# Patient Record
Sex: FEMALE | Race: WHITE | Employment: OTHER | URBAN - METROPOLITAN AREA
[De-identification: names, ages, dates, MRNs, and addresses within clinical notes are randomized per-mention and may not be internally consistent; named-entity substitution may affect disease eponyms.]

---

## 2018-12-29 ENCOUNTER — HOSPITAL ENCOUNTER (EMERGENCY)
Facility: CLINIC | Age: 58
Discharge: HOME | End: 2018-12-30
Attending: EMERGENCY MEDICINE | Admitting: EMERGENCY MEDICINE
Payer: COMMERCIAL

## 2018-12-29 ENCOUNTER — APPOINTMENT (OUTPATIENT)
Dept: GENERAL RADIOLOGY | Facility: CLINIC | Age: 58
End: 2018-12-29
Attending: EMERGENCY MEDICINE
Payer: COMMERCIAL

## 2018-12-29 DIAGNOSIS — S12.601S CLOSED NONDISPLACED FRACTURE OF SEVENTH CERVICAL VERTEBRA, UNSPECIFIED FRACTURE MORPHOLOGY, SEQUELA: ICD-10-CM

## 2018-12-29 DIAGNOSIS — S20.212A CHEST WALL CONTUSION, LEFT, INITIAL ENCOUNTER: ICD-10-CM

## 2018-12-29 PROCEDURE — 99283 EMERGENCY DEPT VISIT LOW MDM: CPT

## 2018-12-29 PROCEDURE — 25000132 ZZH RX MED GY IP 250 OP 250 PS 637: Performed by: EMERGENCY MEDICINE

## 2018-12-29 PROCEDURE — 71101 X-RAY EXAM UNILAT RIBS/CHEST: CPT | Mod: LT

## 2018-12-29 RX ORDER — LISINOPRIL 20 MG/1
40 TABLET ORAL DAILY
COMMUNITY

## 2018-12-29 RX ORDER — OXYCODONE HYDROCHLORIDE 5 MG/1
5 TABLET ORAL ONCE
Status: COMPLETED | OUTPATIENT
Start: 2018-12-29 | End: 2018-12-29

## 2018-12-29 RX ADMIN — OXYCODONE HYDROCHLORIDE 5 MG: 5 TABLET ORAL at 23:50

## 2018-12-29 ASSESSMENT — MIFFLIN-ST. JEOR: SCORE: 1267.15

## 2018-12-29 ASSESSMENT — ENCOUNTER SYMPTOMS: NECK PAIN: 1

## 2018-12-29 NOTE — ED AVS SNAPSHOT
Emergency Department  [address] [place] [id number]  Phone:  [phone]  Fax:  [phone]                                    Billie Rolon   MRN: [id number]    Department:   Emergency Department   Date of Visit:  12/29/2018           After Visit Summary Signature Page    I have received my discharge instructions, and my questions have been answered. I have discussed any challenges I see with this plan with the nurse or doctor.    ..........................................................................................................................................  Patient/Patient Representative Signature      ..........................................................................................................................................  Patient Representative Print Name and Relationship to Patient    ..................................................               ................................................  Date                                   Time    ..........................................................................................................................................  Reviewed by Signature/Title    ...................................................              ..............................................  Date                                               Time          22EPIC Rev 08/18

## 2018-12-30 VITALS
WEIGHT: 150 LBS | BODY MASS INDEX: 24.11 KG/M2 | TEMPERATURE: 96.8 F | HEIGHT: 66 IN | OXYGEN SATURATION: 96 % | DIASTOLIC BLOOD PRESSURE: 83 MMHG | RESPIRATION RATE: 16 BRPM | SYSTOLIC BLOOD PRESSURE: 125 MMHG

## 2018-12-30 RX ORDER — OXYCODONE HYDROCHLORIDE 5 MG/1
5 TABLET ORAL EVERY 6 HOURS PRN
Qty: 8 TABLET | Refills: 0 | Status: SHIPPED | OUTPATIENT
Start: 2018-12-30

## 2018-12-30 RX ORDER — NAPROXEN 500 MG/1
500 TABLET ORAL 2 TIMES DAILY WITH MEALS
Qty: 16 TABLET | Refills: 0 | Status: SHIPPED | OUTPATIENT
Start: 2018-12-30 | End: 2019-01-07

## 2018-12-30 ASSESSMENT — ENCOUNTER SYMPTOMS
NUMBNESS: 0
COUGH: 1
HEADACHES: 0
SHORTNESS OF BREATH: 0
FEVER: 0

## 2018-12-30 NOTE — ED PROVIDER NOTES
History     Chief Complaint:  Chest Pain    The history is provided by the patient.      Billie Rolon is a 58 year old female with a history of hypertension who presents to the emergency department with her daughter in law for evaluation of chest pain. Of note, the patient was seen at [facility] on 12/28 after a witnessed fall where she lost consciousness. Daughter in law who witnessed the fall down the stairs notes that when the patient came to, she was complaining of chest pain although no imaging of the chest was done at [facility]. She did have CTs of the head and cervical spine, results below, where she was diagnosed with a transverse process fracture at C7. Patient consulted at [facility] by neurosurgery who endorsed that she was safe for discharge home with follow up in two weeks. Patient was placed in a cervical collar and discharged home. Since discharge patient has been complaining of chest pain, that worsens with deep breathing and movement and is uncontrolled at home with Advil, last administered at 2000. The persistent chest pain prompted the patient to seek further evaluation here in the ED. Here, the patient complains primarily of the chest pain worsening with coughing and movement as well as some mild lower neck pain.     CT Head without Contrast: [facility] 12/28/18  Impression:   1. No acute intracranial pathology.  2. Air-fluid level with frothy debris in the right sphenoid locule, suggestive of acute sinusitis. Of note, there is no adjacent fracture to suggest blood products as an etiology for fluid in the sinus.    CT Cervical Spine without Contrast: [facility] 12/28/18  Impression:   1. Minimally displaced left C7 transverse process fracture, which does not involve the vertebral artery foramen. No vertebral body fracture.  2. Mild to moderate degenerative disc change of the cervical spine, most significant at C5-6.    Allergies:  Ibuprofen  NSAIDs (has been taking at home without incident)  "    Medications:    Calcium Carbonate  Lisinopril  Naloxone  Aspirin 81 mg  Gabapentin  Fluoxetine   Atenolol  Pravastatin  Robitussin  Cyclobenzaprine    Past Medical History:    Hypertension  Anxiety  Depression    Past Surgical History:    Appendectomy  Tonsillectomy  Breast Cyst Removal   Cervical Conization  D&C    Family History:    Macular Degeneration  Breast Cancer  Colon Cancer  Type II Diabetes Mellitus  MI: Mother   Hypertension  Hyperlipidemia  Anxiety    Social History:  Marital Status: Unknown  Tobacco Use: No  Alcohol Use: Yes, occasionally      Review of Systems   Constitutional: Negative for fever.   Respiratory: Positive for cough. Negative for shortness of breath.    Cardiovascular: Positive for chest pain.   Musculoskeletal: Positive for neck pain.   Neurological: Negative for numbness and headaches.   All other systems reviewed and are negative.        Physical Exam     Patient Vitals for the past 24 hrs:   BP Temp Temp src Heart Rate Resp SpO2 Height Weight   12/29/18 2306 125/83 96.8  F (36  C) Temporal 87 16 94 % 1.676 m (5' 6\") 68 kg (150 lb)       Physical Exam  General: Alert, interactive in mild distress  Head:  Scalp is atraumatic  Eyes:  The pupils are equal, round, and reactive to light    EOM's intact    No scleral icterus  ENT:      Nose:  The external nose is normal  Ears:  External ears are normal  Mouth/Throat: The oropharynx is normal    Mucus membranes are moist      Neck:  Edmunds J collar in place.          CV:  Regular rate and rhythm    No murmur   Resp:  Breath sounds are clear bilaterally    Non-labored, no retractions or accessory muscle use      GI:  Abdomen is soft, no distension, no tenderness.       MS:  Normal strength in all 4 extremities, No midline thoracic or lumbar tenderness. Left parasternal tenderness. Cervical collar in place with lower cervical tenderness.   Skin:  Warm and dry, No rash or lesions noted.  Neuro: Strength 5/5 x4.  Sensation intact  In all 4 " extremities.      GCS: 15  Psych:  Awake. Alert.  Normal affect.      Appropriate interactions.    Emergency Department Course     Imaging:  XR Left Chest with Unilateral Ribs 3 views:   No acute abnormality. As per radiology.     Interventions:  2350 Roxicodone 5 mg, PO    Emergency Department Course:  2321 Nursing notes and vitals reviewed. I performed an exam of the patient as documented above.     Medicine administered as documented above. Blood drawn. This was sent to the lab for further testing, results above.    The patient was sent for a left chest and ribs xray while in the emergency department, findings above.     0015 I rechecked the patient and discussed the results of her workup thus far.     Findings and plan explained to the patient and daughter in law. Patient discharged home with instructions regarding supportive care, medications, and reasons to return. The importance of close follow-up was reviewed.     I personally answered all related questions prior to discharge.       Impression & Plan      Medical Decision Making:  Billie Rolon is a 58 year old female who was seen and evaluated. She took a mechanical fall down several steps 2 days ago. She ws diagnosed with a C7 transverse process fracture at [facility]. She has had worsening left side chest discomfort since that time. Xray here demonstrates no signs of fracture, pneumothorax, pulmonary contusion, pulmonary infiltrate or more concerning illness. She received a single dose of oxycodone here. Upon reading the notes from [facility], she was discharged with a total of 4 oxycodone tablets and is continuing to have pain. She does have a history of chronic pain, however given her acute injury I do believe a short course of oxycodone for breakthrough pain is reasonable. I have prescribed a total of 8 tablets as well as Naproxen and I have recommended close follow up with her primary care provider and pain clinic. Given that this was  traumatic in nature, I do not believe she needs any further cardiac workup nor further laboratory workup.       Diagnosis:    ICD-10-CM    1. Chest wall contusion, left, initial encounter S20.212A    2. Closed nondisplaced fracture of seventh cervical vertebra, unspecified fracture morphology, sequela S12.601S        Disposition:  discharged to home    Discharge Medications:     Medication List      Started    naproxen 500 MG tablet  Commonly known as:  NAPROSYN  500 mg, Oral, 2 TIMES DAILY WITH MEALS     oxyCODONE 5 MG tablet  Commonly known as:  ROXICODONE  5 mg, Oral, EVERY 6 HOURS PRN          Scribe Disclosure:  I, Lauri Mcgee, am serving as a scribe on 12/29/2018 at 11:14 PM to personally document services performed by Salvador Siddiqui MD based on my observations and the provider's statements to me.     Lauri Mcgee  12/29/2018    EMERGENCY DEPARTMENT       Salvador Siddiqui MD  12/30/18 0153

## 2018-12-30 NOTE — DISCHARGE INSTRUCTIONS

## 2018-12-30 NOTE — ED NOTES
Bed: ED09  Expected date:   Expected time:   Means of arrival:   Comments:  Neck chest pain fell 12/27

## 2019-02-05 ENCOUNTER — TRANSFERRED RECORDS (OUTPATIENT)
Dept: PHYSICAL THERAPY | Facility: CLINIC | Age: 59
End: 2019-02-05

## 2022-03-28 ENCOUNTER — MEDICAL CORRESPONDENCE (OUTPATIENT)
Dept: HEALTH INFORMATION MANAGEMENT | Facility: CLINIC | Age: 62
End: 2022-03-28

## 2025-03-31 ENCOUNTER — APPOINTMENT (OUTPATIENT)
Dept: URBAN - METROPOLITAN AREA CLINIC 259 | Age: 65
Setting detail: DERMATOLOGY
End: 2025-04-01

## 2025-03-31 VITALS — HEIGHT: 65 IN | WEIGHT: 150 LBS

## 2025-03-31 DIAGNOSIS — L57.0 ACTINIC KERATOSIS: ICD-10-CM

## 2025-03-31 DIAGNOSIS — L82.1 OTHER SEBORRHEIC KERATOSIS: ICD-10-CM

## 2025-03-31 DIAGNOSIS — L57.8 OTHER SKIN CHANGES DUE TO CHRONIC EXPOSURE TO NONIONIZING RADIATION: ICD-10-CM

## 2025-03-31 DIAGNOSIS — D49.2 NEOPLASM OF UNSPECIFIED BEHAVIOR OF BONE, SOFT TISSUE, AND SKIN: ICD-10-CM

## 2025-03-31 PROCEDURE — OTHER BIOPSY BY SHAVE METHOD: OTHER

## 2025-03-31 PROCEDURE — OTHER LIQUID NITROGEN: OTHER

## 2025-03-31 PROCEDURE — OTHER MIPS QUALITY: OTHER

## 2025-03-31 PROCEDURE — OTHER COUNSELING: OTHER

## 2025-03-31 ASSESSMENT — LOCATION DETAILED DESCRIPTION DERM
LOCATION DETAILED: RIGHT INFERIOR CENTRAL MALAR CHEEK
LOCATION DETAILED: LEFT SUPERIOR LATERAL MALAR CHEEK
LOCATION DETAILED: RIGHT SUPERIOR MEDIAL MALAR CHEEK
LOCATION DETAILED: RIGHT LATERAL SUPERIOR CHEST
LOCATION DETAILED: UPPER STERNUM
LOCATION DETAILED: LEFT CENTRAL MALAR CHEEK

## 2025-03-31 ASSESSMENT — LOCATION SIMPLE DESCRIPTION DERM
LOCATION SIMPLE: RIGHT CHEEK
LOCATION SIMPLE: LEFT CHEEK
LOCATION SIMPLE: CHEST

## 2025-03-31 ASSESSMENT — LOCATION ZONE DERM
LOCATION ZONE: FACE
LOCATION ZONE: TRUNK

## 2025-03-31 NOTE — HPI: SKIN LESION
What Type Of Note Output Would You Prefer (Optional)?: Standard Output
How Severe Is Your Skin Lesion?: moderate
Has Your Skin Lesion Been Treated?: not been treated
Is This A New Presentation, Or A Follow-Up?: Skin Lesions
Additional History: Patient reports concern of spot under eye that has been there for about 5 years. She states it has gotten bigger and denies any bleeding. She also would like some newer spots on nose and chest looked at. She states they don’t have any symptoms.

## 2025-03-31 NOTE — PROCEDURE: BIOPSY BY SHAVE METHOD
Detail Level: Detailed
Depth Of Biopsy: dermis
Was A Bandage Applied: Yes
Size Of Lesion In Cm: 1.5
X Size Of Lesion In Cm: 0
Biopsy Type: H and E
Biopsy Method: double edge Personna blade
Anesthesia Type: 1% lidocaine with epinephrine and a 1:10 solution of 8.4% sodium bicarbonate
Anesthesia Volume In Cc: 0.5
Hemostasis: Drysol
Wound Care: Petrolatum
Dressing: bandage
Destruction After The Procedure: No
Type Of Destruction Used: Curettage
Curettage Text: The wound bed was treated with curettage after the biopsy was performed.
Cryotherapy Text: The wound bed was treated with cryotherapy after the biopsy was performed.
Electrodesiccation Text: The wound bed was treated with electrodesiccation after the biopsy was performed.
Electrodesiccation And Curettage Text: The wound bed was treated with electrodesiccation and curettage after the biopsy was performed.
Silver Nitrate Text: The wound bed was treated with silver nitrate after the biopsy was performed.
Lab: -[id number]
Medical Necessity Information: It is in your best interest to select a reason for this procedure from the list below. All of these items fulfill various CMS LCD requirements except the new and changing color options.
Consent: Written consent was obtained and risks were reviewed including but not limited to scarring, infection, bleeding, scabbing, incomplete removal, nerve damage and allergy to anesthesia.
Post-Care Instructions: I reviewed with the patient in detail post-care instructions. Patient is to keep the biopsy site dry overnight, and then cleanse daily with soap and water and apply Petrolatum once daily until healed.
Notification Instructions: Patient will be notified of biopsy results. However, patient instructed to call the office if not contacted within 2 weeks.
Billing Type: Third-Party Bill
Information: Selecting Yes will display possible errors in your note based on the variables you have selected. This validation is only offered as a suggestion for you. PLEASE NOTE THAT THE VALIDATION TEXT WILL BE REMOVED WHEN YOU FINALIZE YOUR NOTE. IF YOU WANT TO FAX A PRELIMINARY NOTE YOU WILL NEED TO TOGGLE THIS TO 'NO' IF YOU DO NOT WANT IT IN YOUR FAXED NOTE.

## 2025-03-31 NOTE — PROCEDURE: LIQUID NITROGEN
Detail Level: Detailed
Render Post-Care Instructions In Note?: yes
Consent: The patient's written consent was obtained including but not limited to risks of crusting, scabbing, blistering, scarring, darker or lighter pigmentary change, recurrence, incomplete removal and infection.
Post-Care Instructions: I reviewed with the patient in detail post-care instructions. Patient is to wear sunprotection, and avoid picking at any of the treated lesions. Pt may apply Vaseline to crusted or scabbing areas.
Render Note In Bullet Format When Appropriate: No
Duration Of Freeze Thaw-Cycle (Seconds): 0

## 2025-04-23 ENCOUNTER — APPOINTMENT (OUTPATIENT)
Dept: URBAN - METROPOLITAN AREA CLINIC 259 | Age: 65
Setting detail: DERMATOLOGY
End: 2025-04-30

## 2025-04-23 PROBLEM — C44.319 BASAL CELL CARCINOMA OF SKIN OF OTHER PARTS OF FACE: Status: ACTIVE | Noted: 2025-04-23

## 2025-04-23 PROCEDURE — 99213 OFFICE O/P EST LOW 20 MIN: CPT | Mod: 25

## 2025-04-23 PROCEDURE — OTHER CONSULTATION FOR RADIOTHERAPY: OTHER

## 2025-04-23 PROCEDURE — G6001 ECHO GUIDANCE RADIOTHERAPY: HCPCS

## 2025-04-23 NOTE — PROCEDURE: CONSULTATION FOR RADIOTHERAPY
Detail Level: Detailed
Body Location Override (Optional - Billing Will Still Be Based On Selected Body Map Location If Applicable): Right Superior Medial Malar Cheek
Referring Provider: Elaine Deshpande PA-C
X Size Of Lesion In Cm (Optional): 0
Other Plan: Patient was seen today for an IG-SRT Simulation. Patient had questions about IG-SRT and Mohs surgery. An explanation of IG-SRT treatments was provided to the patient. An ultrasound of the lesion was completed. Dr. Davis saw the patient in the room today and answered her questions about Mohs. \\nPatient elects Mohs surgery.

## 2025-06-04 ENCOUNTER — APPOINTMENT (OUTPATIENT)
Dept: URBAN - METROPOLITAN AREA CLINIC 259 | Age: 65
Setting detail: DERMATOLOGY
End: 2025-06-05

## 2025-06-04 PROBLEM — C44.1122 BASAL CELL CARCINOMA OF SKIN OF RIGHT LOWER EYELID, INCLUDING CANTHUS: Status: ACTIVE | Noted: 2025-06-04

## 2025-06-04 PROCEDURE — OTHER MIPS QUALITY: OTHER

## 2025-06-04 PROCEDURE — OTHER PRESCRIPTION: OTHER

## 2025-06-04 PROCEDURE — 14060 TIS TRNFR E/N/E/L 10 SQ CM/<: CPT

## 2025-06-04 PROCEDURE — 17311 MOHS 1 STAGE H/N/HF/G: CPT

## 2025-06-04 PROCEDURE — OTHER MOHS SURGERY: OTHER

## 2025-06-04 RX ORDER — HYDROCODONE BITARTRATE AND ACETAMINOPHEN 10; 325 MG/1; MG/1
TABLET ORAL
Qty: 3 | Refills: 0 | COMMUNITY
Start: 2025-06-04

## 2025-06-04 RX ORDER — CEPHALEXIN 500 MG/1
CAPSULE ORAL BID
Qty: 10 | Refills: 0 | COMMUNITY
Start: 2025-06-04

## 2025-06-11 ENCOUNTER — APPOINTMENT (OUTPATIENT)
Dept: URBAN - METROPOLITAN AREA CLINIC 259 | Age: 65
Setting detail: DERMATOLOGY
End: 2025-06-17

## 2025-06-11 DIAGNOSIS — Z48.02 ENCOUNTER FOR REMOVAL OF SUTURES: ICD-10-CM

## 2025-06-11 PROCEDURE — OTHER COUNSELING: OTHER

## 2025-06-11 PROCEDURE — OTHER RETURN TO REFERRING PROVIDER: OTHER

## 2025-06-11 PROCEDURE — OTHER DIAGNOSIS COMMENT: OTHER

## 2025-06-11 PROCEDURE — OTHER MIPS QUALITY: OTHER

## 2025-06-11 PROCEDURE — OTHER SUTURE REMOVAL (GLOBAL PERIOD): OTHER

## 2025-06-11 PROCEDURE — 99024 POSTOP FOLLOW-UP VISIT: CPT

## 2025-06-11 ASSESSMENT — LOCATION SIMPLE DESCRIPTION DERM: LOCATION SIMPLE: RIGHT CHEEK

## 2025-06-11 ASSESSMENT — LOCATION ZONE DERM: LOCATION ZONE: FACE

## 2025-06-11 ASSESSMENT — LOCATION DETAILED DESCRIPTION DERM: LOCATION DETAILED: RIGHT SUPERIOR MEDIAL MALAR CHEEK
